# Patient Record
Sex: FEMALE | Race: BLACK OR AFRICAN AMERICAN | Employment: FULL TIME | URBAN - METROPOLITAN AREA
[De-identification: names, ages, dates, MRNs, and addresses within clinical notes are randomized per-mention and may not be internally consistent; named-entity substitution may affect disease eponyms.]

---

## 2021-11-24 ENCOUNTER — HOSPITAL ENCOUNTER (EMERGENCY)
Age: 21
Discharge: HOME | End: 2021-11-24
Attending: EMERGENCY MEDICINE
Payer: COMMERCIAL

## 2021-11-24 ENCOUNTER — APPOINTMENT (OUTPATIENT)
Dept: GENERAL RADIOLOGY | Age: 21
End: 2021-11-24
Payer: COMMERCIAL

## 2021-11-24 VITALS
BODY MASS INDEX: 27.07 KG/M2 | SYSTOLIC BLOOD PRESSURE: 115 MMHG | OXYGEN SATURATION: 100 % | DIASTOLIC BLOOD PRESSURE: 71 MMHG | RESPIRATION RATE: 14 BRPM | HEIGHT: 60 IN | HEART RATE: 92 BPM | WEIGHT: 137.9 LBS | TEMPERATURE: 98.1 F

## 2021-11-24 DIAGNOSIS — J06.9 ASTHMA OCCURRING ONLY WITH UPPER RESPIRATORY INFECTION: Primary | ICD-10-CM

## 2021-11-24 DIAGNOSIS — J45.909 ASTHMA OCCURRING ONLY WITH UPPER RESPIRATORY INFECTION: Primary | ICD-10-CM

## 2021-11-24 DIAGNOSIS — Z11.52 ENCOUNTER FOR SCREENING FOR COVID-19: ICD-10-CM

## 2021-11-24 PROCEDURE — 71045 X-RAY EXAM CHEST 1 VIEW: CPT

## 2021-11-24 PROCEDURE — U0005 INFEC AGEN DETEC AMPLI PROBE: HCPCS

## 2021-11-24 PROCEDURE — 99283 EMERGENCY DEPT VISIT LOW MDM: CPT

## 2021-11-24 PROCEDURE — U0003 INFECTIOUS AGENT DETECTION BY NUCLEIC ACID (DNA OR RNA); SEVERE ACUTE RESPIRATORY SYNDROME CORONAVIRUS 2 (SARS-COV-2) (CORONAVIRUS DISEASE [COVID-19]), AMPLIFIED PROBE TECHNIQUE, MAKING USE OF HIGH THROUGHPUT TECHNOLOGIES AS DESCRIBED BY CMS-2020-01-R: HCPCS

## 2021-11-24 RX ORDER — PREDNISONE 20 MG/1
20 TABLET ORAL 2 TIMES DAILY
Qty: 10 TABLET | Refills: 0 | Status: SHIPPED | OUTPATIENT
Start: 2021-11-24 | End: 2021-11-29

## 2021-11-24 RX ORDER — ALBUTEROL SULFATE 90 UG/1
2 AEROSOL, METERED RESPIRATORY (INHALATION) EVERY 4 HOURS PRN
Qty: 18 G | Refills: 0 | Status: SHIPPED | OUTPATIENT
Start: 2021-11-24

## 2021-11-24 NOTE — ED NOTES
Patient given prescription,  discharge instructions verbal and written, patient verbalized understanding. Alert/oriented X4, Clear speech.   Patient exhibits no distress, ambulates with steady gait per self leaving unit, no further request.     Thalia Bai RN  11/24/21 1339

## 2021-11-24 NOTE — ED PROVIDER NOTES
[facility] Emergency Department      CHIEF COMPLAINT  Asthma      HISTORY OF PRESENT ILLNESS  Norberto Banerjee is a 22 y.o. female with a history of childhood asthma presents with wheezing and shortness of breath. She states that she has had a little bit of a cold over the past few days with some congestion and mild cough. She has noticed that when she climbs stairs or exerts herself she starts to wheeze. She does not have an inhaler at home. She states she used to have asthma as a child but has not had issues in quite some time. She states however the last couple of times that she has gotten \"sick\" she has had some wheezing. She denies fevers or chest pain. Currently she is asymptomatic. No lower extremity swelling. .   No other complaints, modifying factors or associated symptoms. I have reviewed the following from the nursing documentation. History reviewed. No pertinent past medical history. History reviewed. No pertinent surgical history. History reviewed. No pertinent family history. Social History     Socioeconomic History    Marital status: Single     Spouse name: Not on file    Number of children: Not on file    Years of education: Not on file    Highest education level: Not on file   Occupational History    Not on file   Tobacco Use    Smoking status: Never Smoker    Smokeless tobacco: Never Used   Substance and Sexual Activity    Alcohol use: Not Currently    Drug use: Not Currently    Sexual activity: Not on file   Other Topics Concern    Not on file   Social History Narrative    Not on file     Social Determinants of Health     Financial Resource Strain:     Difficulty of Paying Living Expenses: Not on file   Food Insecurity:     Worried About Running Out of Food in the Last Year: Not on file    Kate of Food in the Last Year: Not on file   Transportation Needs:     Lack of Transportation (Medical): Not on file    Lack of Transportation (Non-Medical):  Not on file   Physical Activity:     Days of Exercise per Week: Not on file    Minutes of Exercise per Session: Not on file   Stress:     Feeling of Stress : Not on file   Social Connections:     Frequency of Communication with Friends and Family: Not on file    Frequency of Social Gatherings with Friends and Family: Not on file    Attends Zoroastrianism Services: Not on file    Active Member of [address] [place] [address] or Organizations: Not on file    Attends Club or Organization Meetings: Not on file    Marital Status: Not on file   Intimate Partner Violence:     Fear of Current or Ex-Partner: Not on file    Emotionally Abused: Not on file    Physically Abused: Not on file    Sexually Abused: Not on file   Housing Stability:     Unable to Pay for Housing in the Last Year: Not on file    Number of [place] in the Last Year: Not on file    Unstable Housing in the Last Year: Not on file     No current facility-administered medications for this encounter. Current Outpatient Medications   Medication Sig Dispense Refill    predniSONE (DELTASONE) 20 MG tablet Take 1 tablet by mouth 2 times daily for 5 days 10 tablet 0    albuterol sulfate HFA (VENTOLIN HFA) 108 (90 Base) MCG/ACT inhaler Inhale 2 puffs into the lungs every 4 hours as needed for Wheezing or Shortness of Breath 18 g 0     No Known Allergies    REVIEW OF SYSTEMS  10 systems reviewed, pertinent positives per HPI otherwise noted to be negative. PHYSICAL EXAM  /71   Pulse 92   Temp 98.1 °F (36.7 °C) (Oral)   Resp 14   Ht 5' (1.524 m)   Wt 137 lb 14.4 oz (62.6 kg)   LMP 11/02/2021   SpO2 100%   BMI 26.93 kg/m²   GENERAL APPEARANCE: Awake and alert. Cooperative. No acute distress. HEAD: Normocephalic. Atraumatic. EYES: EOM's grossly intact. ENT: Mucous membranes are moist.   NECK: Supple, trachea midline. HEART: RRR. LUNGS: Respirations unlabored. CTAB. Good air exchange. No wheezes, rales, or rhonchi.   Speaking comfortably in full sentences. ABDOMEN: Nondistended. EXTREMITIES: No peripheral edema. MAEE. No acute deformities. SKIN: Warm, dry and intact. No acute rashes. NEUROLOGICAL: Alert and oriented X 3. CN II-XII grossly intact. Strength 5/5, sensation intact. Normal coordination. Steady gait. PSYCHIATRIC: Normal mood and affect. LABS  I have reviewed all labs for this visit. Results for orders placed or performed during the hospital encounter of 11/24/21   COVID-19   Result Value Ref Range    SARS-CoV-2 Not Detected Not detected             RADIOLOGY  X-RAYS:  I have reviewed radiologic plain film image(s). ALL OTHER NON-PLAIN FILM IMAGES SUCH AS CT, ULTRASOUND AND MRI HAVE BEEN READ BY THE RADIOLOGIST. XR CHEST 1 VIEW   Final Result   1. No acute cardiopulmonary abnormalities. Rechecks: Physical assessment performed. She has been updated on her normal x-ray findings and treatment plan. ED COURSE/MDM  Patient seen and evaluated. Here the patient is afebrile with normal vitals signs. Old records reviewed. Here she is well-appearing. Currently asymptomatic. Lungs are clear bilaterally with no wheezing noted. Chest x-ray done and it is normal.  I did do a Covid test given that she is having some upper respiratory symptoms. She is likely having upper respiratory infection causing some reactive airway disease. I will give her a prescription for steroids to start at home and a prescription for an inhaler. I do not think she needs further work-up here. I do not think this shortness of breath she is describing is related to ACS or PE. She is currently completely asymptomatic. She is also 22years old very healthy. She has a heart score of 0. Labs and imaging reviewed and results discussed with patient. Patient was reassessed as noted above . Plan of care discussed with patient. Patient in agreement with plan. Strict return precautions have been given.     Patient was given scripts for the following medications. I counseled patient how to take these medications. Discharge Medication List as of 11/24/2021  3:23 PM      START taking these medications    Details   predniSONE (DELTASONE) 20 MG tablet Take 1 tablet by mouth 2 times daily for 5 days, Disp-10 tablet, R-0Normal      albuterol sulfate HFA (VENTOLIN HFA) 108 (90 Base) MCG/ACT inhaler Inhale 2 puffs into the lungs every 4 hours as needed for Wheezing or Shortness of Breath, Disp-18 g, R-0Normal               CLINICAL IMPRESSION  1. Asthma occurring only with upper respiratory infection    2. Encounter for screening for COVID-19        Blood pressure 115/71, pulse 92, temperature 98.1 °F (36.7 °C), temperature source Oral, resp. rate 14, height 5' (1.524 m), weight 137 lb 14.4 oz (62.6 kg), last menstrual period 11/02/2021, SpO2 100 %. DISPOSITION  Jens Mercado was discharged to home in stable condition.     (Please note this note was completed with a voice recognition program.  Efforts were made to edit the dictations but occasionally words are mis-transcribed.)       Babs Urbano MD  11/28/21 1490 R shoulder/complains of pain/discomfort

## 2021-11-25 LAB — SARS-COV-2: NOT DETECTED

## 2022-07-30 ENCOUNTER — HOSPITAL ENCOUNTER (EMERGENCY)
Age: 22
Discharge: HOME | End: 2022-07-30
Attending: EMERGENCY MEDICINE
Payer: COMMERCIAL

## 2022-07-30 VITALS
DIASTOLIC BLOOD PRESSURE: 68 MMHG | SYSTOLIC BLOOD PRESSURE: 117 MMHG | BODY MASS INDEX: 27.38 KG/M2 | TEMPERATURE: 98.7 F | WEIGHT: 140.2 LBS | OXYGEN SATURATION: 98 % | HEART RATE: 75 BPM | RESPIRATION RATE: 16 BRPM

## 2022-07-30 DIAGNOSIS — R68.84 JAW PAIN: Primary | ICD-10-CM

## 2022-07-30 PROCEDURE — 99283 EMERGENCY DEPT VISIT LOW MDM: CPT

## 2022-07-30 RX ORDER — NAPROXEN 250 MG/1
250 TABLET ORAL 2 TIMES DAILY WITH MEALS
Qty: 30 TABLET | Refills: 0 | Status: SHIPPED | OUTPATIENT
Start: 2022-07-30

## 2022-07-31 NOTE — DISCHARGE INSTRUCTIONS
Please return if you have any new, worsening, or concerning symptoms like inability to eat/drink/walk, fevers, hearing loss, inability to chew, rash, swelling. Contact your primary care physician tomorrow to make a follow up appointment this/next week.

## 2022-07-31 NOTE — ED PROVIDER NOTES
[address] [facility] PROVIDER NOTE    Patient Identification  Pt Name: Falguni Garcia  MRN: [id number]  [place] 2000  Date of evaluation: 7/30/2022  Provider: Taisha Coronado MD  PCP: No primary care provider on file. Chief Complaint  Jaw pain    HPI  History provided by patient   This is a 25 y.o. female who presents to the ED for right-sided jaw pain. Started today. Sometimes hears clicking. Never had before. No trauma. No tooth pain. No facial swelling. No fevers or chills. No nausea or vomiting. No neck stiffness. No headache. No hearing changes. [employer]  12 systems reviewed, pertinent positives/negatives per HPI otherwise noted to be negative. I have reviewed the following nursing documentation:  Allergies: Patient has no known allergies. Past medical history: No past medical history on file. Past surgical history: No past surgical history on file. Home medications:   Previous Medications    ALBUTEROL SULFATE HFA (VENTOLIN HFA) 108 (90 BASE) MCG/ACT INHALER    Inhale 2 puffs into the lungs every 4 hours as needed for Wheezing or Shortness of Breath       Social history:  reports that she has never smoked. She has never used smokeless tobacco. She reports that she does not currently use alcohol. She reports that she does not currently use drugs. Family history:  No family history on file. Exam  ED Triage Vitals [07/30/22 2210]   BP Temp Temp Source Heart Rate Resp SpO2 Height Weight   117/68 98.7 °F (37.1 °C) Oral 75 16 98 % -- 140 lb 3.2 oz (63.6 kg)     Nursing note and vitals reviewed. Constitutional: In no acute distress  HENT:      Head: Normocephalic      Ears: External ears normal.      Nose: Nose normal.     Mouth: Membrane mucosa moist.  No facial swelling. No stridor. Good range of motion of the neck. Normal tympanic membranes. No lymphadenopathy. Normal oropharynx exam.  Normal appearing teeth.   Able to bite down on a stick and prevent me from pulling it out  Eyes: No discharge. Neck: Supple. Trachea midline. Cardiovascular: Regular rate. Warm extremities  Pulmonary/Chest: Effort normal. No respiratory distress. Abdominal: Soft. No distension. Nontender  : Deferred  Rectal: Deferred   Musculoskeletal: Moves all extremities. No gross deformity. Neurological: Alert and oriented. Face symmetric. Speech is clear. Skin: Warm and dry. No rash  Psychiatric: Normal mood and affect. Behavior is normal.    Procedures    Radiology  No orders to display       Labs  No results found for this visit on 07/30/22. Screenings           MDM and ED Course  This is a 25 y.o. female who presents to the ED for jaw pain with clicking that started today. No infection or swollen glands noted. Likely TMJ. Normal neuro exam.  No evidence of Bell's palsy. Counseled to take NSAIDs and perform jaw exercises and follow-up with doctor. All questions answered and return precautions given         [unfilled]    Is this patient to be included in the SEP-1 Core Measure due to severe sepsis or septic shock? No   Exclusion criteria - the patient is NOT to be included for SEP-1 Core Measure due to:  2+ SIRS criteria are not met        Final Impression  1. Jaw pain        Blood pressure 117/68, pulse 75, temperature 98.7 °F (37.1 °C), temperature source Oral, resp. rate 16, weight 140 lb 3.2 oz (63.6 kg), SpO2 98 %. Disposition:  DISPOSITION Decision To Discharge 07/30/2022 10:49:22 PM      Patient Referrals:  No follow-up provider specified. Discharge Medications:  New Prescriptions    No medications on file       Discontinued Medications:  Discontinued Medications    No medications on file       This chart was generated using the [address] [place] [address] [employer] system. I created this record but it may contain dictation errors given the limitations of this technology.         Chris Gentile MD  07/30/22 7707